# Patient Record
Sex: FEMALE | Race: WHITE | HISPANIC OR LATINO | Employment: FULL TIME | URBAN - METROPOLITAN AREA
[De-identification: names, ages, dates, MRNs, and addresses within clinical notes are randomized per-mention and may not be internally consistent; named-entity substitution may affect disease eponyms.]

---

## 2019-12-19 ENCOUNTER — APPOINTMENT (EMERGENCY)
Dept: RADIOLOGY | Facility: HOSPITAL | Age: 49
End: 2019-12-19
Payer: COMMERCIAL

## 2019-12-19 ENCOUNTER — HOSPITAL ENCOUNTER (EMERGENCY)
Facility: HOSPITAL | Age: 49
Discharge: HOME | End: 2019-12-19
Attending: EMERGENCY MEDICINE
Payer: COMMERCIAL

## 2019-12-19 VITALS
OXYGEN SATURATION: 100 % | RESPIRATION RATE: 18 BRPM | WEIGHT: 199.52 LBS | TEMPERATURE: 98.9 F | HEART RATE: 80 BPM | BODY MASS INDEX: 37.7 KG/M2 | SYSTOLIC BLOOD PRESSURE: 123 MMHG | DIASTOLIC BLOOD PRESSURE: 67 MMHG

## 2019-12-19 DIAGNOSIS — S61.209A AVULSION OF SKIN OF FINGER, INITIAL ENCOUNTER: ICD-10-CM

## 2019-12-19 DIAGNOSIS — S63.256A CLOSED DISLOCATION OF RIGHT LITTLE FINGER: Primary | ICD-10-CM

## 2019-12-19 PROCEDURE — 99283 EMERGENCY DEPT VISIT LOW MDM: CPT

## 2019-12-19 PROCEDURE — 12001 RPR S/N/AX/GEN/TRNK 2.5CM/<: CPT | Performed by: EMERGENCY MEDICINE

## 2019-12-19 PROCEDURE — 26750 TREAT FINGER FRACTURE EACH: CPT | Performed by: EMERGENCY MEDICINE

## 2019-12-19 PROCEDURE — 73130 X-RAY EXAM OF HAND: CPT

## 2019-12-19 PROCEDURE — 73140 X-RAY EXAM OF FINGER(S): CPT

## 2019-12-19 PROCEDURE — 99283 EMERGENCY DEPT VISIT LOW MDM: CPT | Performed by: EMERGENCY MEDICINE

## 2019-12-19 RX ORDER — MELOXICAM 7.5 MG/1
7.5 TABLET ORAL DAILY
COMMUNITY

## 2019-12-19 RX ORDER — OXYCODONE HYDROCHLORIDE AND ACETAMINOPHEN 5; 325 MG/1; MG/1
2 TABLET ORAL ONCE
Status: COMPLETED | OUTPATIENT
Start: 2019-12-19 | End: 2019-12-19

## 2019-12-19 RX ORDER — OXYCODONE HYDROCHLORIDE AND ACETAMINOPHEN 5; 325 MG/1; MG/1
1 TABLET ORAL EVERY 4 HOURS PRN
COMMUNITY

## 2019-12-19 RX ORDER — IBUPROFEN 600 MG/1
600 TABLET ORAL ONCE
Status: COMPLETED | OUTPATIENT
Start: 2019-12-19 | End: 2019-12-19

## 2019-12-19 RX ORDER — GINSENG 100 MG
1 CAPSULE ORAL ONCE
Status: DISCONTINUED | OUTPATIENT
Start: 2019-12-19 | End: 2019-12-19

## 2019-12-19 RX ORDER — OXYCODONE HYDROCHLORIDE AND ACETAMINOPHEN 5; 325 MG/1; MG/1
1 TABLET ORAL EVERY 4 HOURS PRN
Qty: 15 TABLET | Refills: 0 | Status: SHIPPED | OUTPATIENT
Start: 2019-12-19 | End: 2019-12-29

## 2019-12-19 RX ORDER — IBUPROFEN 600 MG/1
600 TABLET ORAL EVERY 6 HOURS PRN
Qty: 30 TABLET | Refills: 0 | Status: SHIPPED | OUTPATIENT
Start: 2019-12-19

## 2019-12-19 RX ADMIN — IBUPROFEN 600 MG: 600 TABLET ORAL at 21:55

## 2019-12-19 RX ADMIN — OXYCODONE HYDROCHLORIDE AND ACETAMINOPHEN 2 TABLET: 5; 325 TABLET ORAL at 21:54

## 2019-12-20 NOTE — ED PROVIDER NOTES
History  Chief Complaint   Patient presents with    Finger Injury     Patient states slipped while holding baby, tried to catch self and fell foward  Obv deformity to right 5th digit  Also states hit face but denies LOC   Fall     Patient presents after a fall for a finger injury  Patient was holding a baby in [employer] when she slipped on ice  Patient does not remember how she fell but did hit her face on the concrete  Denies any loss of consciousness  Only complaint is a deformity to her right little finger  History provided by:  Patient and relative   used: No    Fall   Mechanism of injury: fall    Injury location:  Face and finger  Facial injury location:  Face and lower lip  Finger injury location:  R little finger  Incident location:  Outdoors  Arrived directly from scene: yes    Fall:     Fall occurred: slipped on ice  Impact surface:  Reading    Point of impact:  Unable to specify    Entrapped after fall: no    Protective equipment: none    Suspicion of alcohol use: no    Suspicion of drug use: no    Tetanus status:  Up to date  Prior to arrival data:     Bystander interventions:  None    Patient ambulatory at scene: yes      Blood loss:  Minimal    Responsiveness at scene:  Alert    Loss of consciousness: no      Amnesic to event: no    Associated symptoms: no abdominal pain, no chest pain, no difficulty breathing, no headaches, no loss of consciousness, no nausea, no neck pain and no vomiting    Risk factors: no anticoagulation therapy        Prior to Admission Medications   Prescriptions Last Dose Informant Patient Reported? Taking?   meloxicam (MOBIC) 7 5 mg tablet  Self Yes Yes   Sig: Take 7 5 mg by mouth daily   oxyCODONE-acetaminophen (PERCOCET) 5-325 mg per tablet  Self Yes Yes   Sig: Take 1 tablet by mouth every 4 (four) hours as needed for moderate pain or severe pain      Facility-Administered Medications: None       History reviewed   No pertinent past medical history  History reviewed  No pertinent surgical history  History reviewed  No pertinent family history  I have reviewed and agree with the history as documented  Social History     Tobacco Use    Smoking status: Current Every Day Smoker    Smokeless tobacco: Never Used   Substance Use Topics    Alcohol use: Never     Frequency: Never    Drug use: Never        Review of Systems   Eyes: Negative for visual disturbance  Respiratory: Negative for chest tightness and shortness of breath  Cardiovascular: Negative for chest pain  Gastrointestinal: Negative for abdominal pain, nausea and vomiting  Musculoskeletal: Positive for arthralgias and joint swelling  Negative for neck pain  Skin: Positive for wound  Negative for color change, pallor and rash  Allergic/Immunologic: Negative for immunocompromised state  Neurological: Negative for dizziness, loss of consciousness, light-headedness and headaches  Psychiatric/Behavioral: Negative for confusion  All other systems reviewed and are negative  Physical Exam  Physical Exam   Constitutional: She is oriented to person, place, and time  She appears well-developed and well-nourished  Non-toxic appearance  She does not have a sickly appearance  She does not appear ill  No distress  HENT:   Head: Normocephalic  Head is with abrasion  Head is without raccoon's eyes, without Jin's sign, without contusion and without laceration  Right Ear: External ear normal    Left Ear: External ear normal    Nose: Nose normal  No nose lacerations, sinus tenderness or nasal deformity  No epistaxis  Right sinus exhibits no maxillary sinus tenderness and no frontal sinus tenderness  Left sinus exhibits no maxillary sinus tenderness and no frontal sinus tenderness  Mouth/Throat: Oropharynx is clear and moist and mucous membranes are normal  Mucous membranes are not pale and not cyanotic  No trismus in the jaw     Small abrasions to the right temple and right lower lip  No lacerations  Eyes: Pupils are equal, round, and reactive to light  Conjunctivae, EOM and lids are normal  Right conjunctiva is not injected  Left conjunctiva is not injected  Right eye exhibits normal extraocular motion  Left eye exhibits normal extraocular motion  Pupils are equal    Neck: Normal range of motion  Neck supple  No tracheal tenderness, no spinous process tenderness and no muscular tenderness present  No neck rigidity  No tracheal deviation present  Cardiovascular: Normal rate, regular rhythm, normal heart sounds and intact distal pulses  Exam reveals no friction rub  No murmur heard  Pulmonary/Chest: Effort normal and breath sounds normal  No accessory muscle usage or stridor  No tachypnea  No respiratory distress  She has no decreased breath sounds  She has no wheezes  She has no rales  She exhibits no tenderness, no bony tenderness, no laceration, no crepitus, no deformity and no retraction  Abdominal: Soft  She exhibits no distension  There is no tenderness  There is no rebound and no guarding  Musculoskeletal: She exhibits no edema  Right shoulder: Normal         Left shoulder: Normal         Right elbow: Normal        Left elbow: Normal         Right wrist: Normal         Left wrist: Normal         Right hip: Normal         Left hip: Normal         Right knee: Normal         Left knee: Normal         Right ankle: Normal         Left ankle: Normal         Cervical back: Normal  She exhibits normal range of motion, no tenderness, no bony tenderness, no swelling, no deformity and no laceration  Thoracic back: Normal  She exhibits normal range of motion, no tenderness, no bony tenderness, no deformity and no laceration  Lumbar back: Normal  She exhibits normal range of motion, no tenderness, no bony tenderness, no deformity and no laceration          Right hand: She exhibits decreased range of motion, tenderness, bony tenderness, deformity, laceration and swelling  She exhibits normal capillary refill  Hands:  Neurological: She is alert and oriented to person, place, and time  Skin: Skin is warm, dry and intact  She is not diaphoretic  No pallor  Psychiatric: She has a normal mood and affect  Nursing note and vitals reviewed  Vital Signs  ED Triage Vitals [12/19/19 2031]   Temperature Pulse Respirations Blood Pressure SpO2   98 9 °F (37 2 °C) (!) 116 20 (!) 172/82 96 %      Temp Source Heart Rate Source Patient Position - Orthostatic VS BP Location FiO2 (%)   Temporal Monitor Sitting Left arm --      Pain Score       Worst Possible Pain           Vitals:    12/19/19 2031 12/19/19 2130 12/19/19 2227 12/19/19 2324   BP: (!) 172/82 146/84 151/82 123/67   Pulse: (!) 116 101 86 80   Patient Position - Orthostatic VS: Sitting Sitting Sitting Sitting         Visual Acuity  Visual Acuity      Most Recent Value   L Pupil Size (mm)  3   R Pupil Size (mm)  3          ED Medications  Medications   oxyCODONE-acetaminophen (PERCOCET) 5-325 mg per tablet 2 tablet (2 tablets Oral Given 12/19/19 2154)   ibuprofen (MOTRIN) tablet 600 mg (600 mg Oral Given 12/19/19 2155)       Diagnostic Studies  Results Reviewed     None                 XR finger fifth digit-pinkie RIGHT   ED Interpretation by Karthikeyan Dooley DO (12/19 2304)   Successful reduction  Questionable irregularity of the 5th metacarpal bone near the joint of the PIP but this is best only seen on one view which is the oblique view  XR hand 3+ views RIGHT   ED Interpretation by Karthikeyan Dooley DO (12/19 2230)   PIP dislocation of the little finger  No fracture                 Procedures  Laceration repair  Date/Time: 12/19/2019 11:50 PM  Performed by: Karthikeyan Dooley DO  Authorized by: Karthikeyan Dooley DO   Consent: Verbal consent obtained    Risks and benefits: risks, benefits and alternatives were discussed  Consent given by: patient  Patient understanding: patient states understanding of the procedure being performed  Radiology Images displayed and confirmed  If images not available, report reviewed: imaging studies available  Patient identity confirmed: verbally with patient  Time out: Immediately prior to procedure a "time out" was called to verify the correct patient, procedure, equipment, support staff and site/side marked as required  Body area: upper extremity  Location details: right small finger  Laceration length: 1 cm  Tendon involvement: none  Nerve involvement: none  Vascular damage: no    Sedation:  Patient sedated: no      Wound Dehiscence:  Superficial Wound Dehiscence: simple closure      Procedure Details:  Preparation: Patient was prepped and draped in the usual sterile fashion  Irrigation solution: saline  Irrigation method: syringe  Amount of cleaning: standard  Debridement: none  Degree of undermining: none  Skin closure: glue  Technique: simple  Approximation: close  Approximation difficulty: simple  Dressing: splint (Not adhesive dressing)  Patient tolerance: Patient tolerated the procedure well with no immediate complications    Orthopedic injury treatment  Date/Time: 12/19/2019 11:50 PM  Performed by: Jasson Morfin DO  Authorized by: Jasson Morfin DO     Patient Location:  ED  Other Assisting Provider: Yes (comment)    Verbal consent obtained?: Yes    Risks and benefits: Risks, benefits and alternatives were discussed    Consent given by:  Patient  Patient states understanding of procedure being performed: Yes    Radiology Images displayed and confirmed   If images not available, report reviewed: Yes    Patient identity confirmed:  Verbally with patient  Time out: Immediately prior to the procedure a time out was called    Neurovascular status: Neurovascularly intact    Distal perfusion: normal    Neurological function: normal    Range of motion: normal    Local anesthesia used?: No    Immobilization: Splint  Supplies used:  Ortho-Glass  Neurovascular status: Neurovascularly intact    Distal perfusion: normal    Neurological function: normal               ED Course  ED Course as of Dec 20 0104   Thu Dec 19, 2019   2301 XR finger fifth digit-pinkie RIGHT                               MDM  Number of Diagnoses or Management Options  Avulsion of skin of finger, initial encounter: new and requires workup  Closed dislocation of right little finger: new and requires workup  Diagnosis management comments: Patient presents after a fall for a finger injury  Patient was holding a baby in [employer] when she slipped on ice  Patient does not remember how she fell but did hit her face on the concrete  Denies any loss of consciousness  Only complaint is a deformity to her right little finger  On exam there is an obvious deformity with pain to the entire finger and metacarpal bones  There is decreased range of motion but neurovascularly is intact  Patient has a small avulsion near the tip of her finger and a small laceration to the middle part of the finger  Plan is to start with an x-ray, give medications for pain, clean the finger and repair any lacerations  Management of finger will be determined based on the x-ray  10:43 PM  X-ray reviewed with the patient  No fracture noted but did have a dislocation  I was able to reduce his very easily on re-examination of the finger  Clinically this seems well reduced but the patient still has some pain in the area so I will obtain a 2nd x-ray to confirm  Repeat x-ray confirms good relocation  Questionable irregularity around the MCP adjacent to the D IP joint of the right little finger  Only seen best on the oblique view  Patient will be placed in a finger splint any way, small avulsion will be glued and I will have her follow up with Hand surgery         Amount and/or Complexity of Data Reviewed  Tests in the radiology section of CPT®: ordered and reviewed  Review and summarize past medical records: yes  Independent visualization of images, tracings, or specimens: yes    Patient Progress  Patient progress: stable        Disposition  Final diagnoses:   Closed dislocation of right little finger   Avulsion of skin of finger, initial encounter - right little finger     Time reflects when diagnosis was documented in both MDM as applicable and the Disposition within this note     Time User Action Codes Description Comment    12/19/2019 11:12 PM Marsha Das Add [Q58 460R] Closed dislocation of right little finger     12/19/2019 11:22 PM Barbara Flores Add [S61 209A] Avulsion of skin of finger, initial encounter     12/19/2019 11:22 PM Barbara Flores Modify [S61 209A] Avulsion of skin of finger, initial encounter right little finger      ED Disposition     ED Disposition Condition Date/Time Comment    Discharge Stable Thu Dec 19, 2019 11:11 PM [facility] discharge to home/self care  Follow-up Information     Follow up With Specialties Details Why [id number] Edwardo Johnson MD Orthopedic Surgery, Hand Surgery Call today To schedule an appointment as soon as you can [address] Jennifer Corbett MD Plastic Surgery, [address] [place] [address]/ Missael Golden             Discharge Medication List as of 12/19/2019 11:24 PM      START taking these medications    Details   ibuprofen (MOTRIN) 600 mg tablet Take 1 tablet (600 mg total) by mouth every 6 (six) hours as needed for moderate pain, Starting Thu 12/19/2019, Normal      !! oxyCODONE-acetaminophen (PERCOCET) 5-325 mg per tablet Take 1 tablet by mouth every 4 (four) hours as needed for moderate pain for up to 10 daysMax Daily Amount: 6 tablets, Starting Thu 12/19/2019, Until Sun 12/29/2019, Normal       !! - Potential duplicate medications found  Please discuss with provider        CONTINUE these medications which have NOT CHANGED    Details   meloxicam (MOBIC) 7 5 mg tablet Take 7 5 mg by mouth daily, Historical Med      !! oxyCODONE-acetaminophen (PERCOCET) 5-325 mg per tablet Take 1 tablet by mouth every 4 (four) hours as needed for moderate pain or severe pain, Historical Med       !! - Potential duplicate medications found  Please discuss with provider  No discharge procedures on file      ED Provider  Electronically Signed by           Tom Kothari DO  12/20/19 0108

## 2020-03-06 ENCOUNTER — HOSPITAL ENCOUNTER (EMERGENCY)
Facility: HOSPITAL | Age: 50
Discharge: HOME | End: 2020-03-06
Attending: EMERGENCY MEDICINE | Admitting: EMERGENCY MEDICINE
Payer: COMMERCIAL

## 2020-03-06 VITALS
SYSTOLIC BLOOD PRESSURE: 130 MMHG | WEIGHT: 184.53 LBS | OXYGEN SATURATION: 97 % | DIASTOLIC BLOOD PRESSURE: 73 MMHG | TEMPERATURE: 98.5 F | HEART RATE: 99 BPM | RESPIRATION RATE: 16 BRPM | BODY MASS INDEX: 34.87 KG/M2

## 2020-03-06 DIAGNOSIS — T78.3XXA ANGIOEDEMA, INITIAL ENCOUNTER: ICD-10-CM

## 2020-03-06 DIAGNOSIS — T78.40XA ALLERGIC REACTION, INITIAL ENCOUNTER: Primary | ICD-10-CM

## 2020-03-06 PROCEDURE — 99283 EMERGENCY DEPT VISIT LOW MDM: CPT | Performed by: EMERGENCY MEDICINE

## 2020-03-06 PROCEDURE — 99283 EMERGENCY DEPT VISIT LOW MDM: CPT

## 2020-03-06 RX ORDER — PREDNISONE 20 MG/1
40 TABLET ORAL DAILY
Qty: 8 TABLET | Refills: 0 | Status: SHIPPED | OUTPATIENT
Start: 2020-03-06 | End: 2020-03-10

## 2020-03-06 RX ORDER — FAMOTIDINE 20 MG/1
20 TABLET, FILM COATED ORAL ONCE
Status: COMPLETED | OUTPATIENT
Start: 2020-03-06 | End: 2020-03-06

## 2020-03-06 RX ORDER — CYCLOBENZAPRINE HCL 10 MG
10 TABLET ORAL 3 TIMES DAILY PRN
COMMUNITY

## 2020-03-06 RX ORDER — MELOXICAM 15 MG/1
TABLET ORAL
COMMUNITY

## 2020-03-06 RX ORDER — FAMOTIDINE 20 MG/1
20 TABLET, FILM COATED ORAL 2 TIMES DAILY
Qty: 14 TABLET | Refills: 0 | Status: SHIPPED | OUTPATIENT
Start: 2020-03-06 | End: 2020-03-13

## 2020-03-06 RX ORDER — PREDNISONE 20 MG/1
60 TABLET ORAL ONCE
Status: COMPLETED | OUTPATIENT
Start: 2020-03-06 | End: 2020-03-06

## 2020-03-06 RX ORDER — DIPHENHYDRAMINE HCL 25 MG
50 TABLET ORAL ONCE
Status: COMPLETED | OUTPATIENT
Start: 2020-03-06 | End: 2020-03-06

## 2020-03-06 RX ADMIN — PREDNISONE 60 MG: 20 TABLET ORAL at 16:26

## 2020-03-06 RX ADMIN — FAMOTIDINE 20 MG: 20 TABLET, FILM COATED ORAL at 16:26

## 2020-03-06 RX ADMIN — DIPHENHYDRAMINE HCL 50 MG: 25 TABLET ORAL at 16:26

## 2020-03-06 NOTE — ED PROVIDER NOTES
History  Chief Complaint   Patient presents with    Facial Swelling     Reports recent frequent allergic reaction to unknown substance at work place  Prescribed zyrtec  Today woke with swelling to b/l hands, L side of face  Denies difficulty breathing, difficutly swallowing  This is an otherwise healthy 31-year-old male who presents with an allergic reaction  The patient states that for the past few months, she has been experiencing allergic reactions while working at [employer] to an unknown substance  The patient states that she has been taking Zyrtec which alleviates her symptoms  She was seen by her doctor and was placed on a standing dose of Zyrtec  After working at [employer] last night, the patient woke up this morning with left-sided facial swelling and bilateral hand rash in itching  She took a Zyrtec without relief  She came to the emergency department for evaluation  Denies any tongue swelling, throat swelling, difficulty breathing/stridor, chest pain/shortness of breath, abdominal pain, nausea/vomiting  No medication changes  Denies fever/chills, nausea/vomiting, lightheadedness/dizziness, numbness/weakness, headache, change in vision, URI symptoms, neck pain, chest pain, palpitations, shortness of breath, cough, back pain, flank pain, abdominal pain, diarrhea, hematochezia, melena, dysuria, hematuria, abnormal vaginal discharge/bleeding  Prior to Admission Medications   Prescriptions Last Dose Informant Patient Reported? Taking?    cyclobenzaprine (FLEXERIL) 10 mg tablet   Yes Yes   Sig: Take 10 mg by mouth 3 (three) times a day as needed for muscle spasms   ibuprofen (MOTRIN) 600 mg tablet   No No   Sig: Take 1 tablet (600 mg total) by mouth every 6 (six) hours as needed for moderate pain   meloxicam (MOBIC) 15 mg tablet   Yes No   Sig: Take by mouth   meloxicam (MOBIC) 7 5 mg tablet  Self Yes No   Sig: Take 7 5 mg by mouth daily   oxyCODONE-acetaminophen (PERCOCET) 5-325 mg per tablet  Self Yes Yes   Sig: Take 1 tablet by mouth every 4 (four) hours as needed for moderate pain or severe pain      Facility-Administered Medications: None       History reviewed  No pertinent past medical history  History reviewed  No pertinent surgical history  History reviewed  No pertinent family history  I have reviewed and agree with the history as documented  E-Cigarette/Vaping     E-Cigarette/Vaping Substances     Social History     Tobacco Use    Smoking status: Current Every Day Smoker     Packs/day: 1 00    Smokeless tobacco: Never Used   Substance Use Topics    Alcohol use: Never     Frequency: Never    Drug use: Never       Review of Systems   Constitutional: Negative for chills and fever  HENT: Positive for facial swelling  Negative for rhinorrhea, sore throat and trouble swallowing  Eyes: Negative for photophobia and visual disturbance  Respiratory: Negative for cough, chest tightness and shortness of breath  Cardiovascular: Negative for chest pain, palpitations and leg swelling  Gastrointestinal: Negative for abdominal pain, blood in stool, diarrhea, nausea and vomiting  Endocrine: Negative for polyuria  Genitourinary: Negative for dysuria, flank pain, hematuria, vaginal bleeding and vaginal discharge  Musculoskeletal: Negative for back pain and neck pain  Skin: Positive for rash  Negative for color change  Allergic/Immunologic: Negative for immunocompromised state  Neurological: Negative for dizziness, weakness, light-headedness, numbness and headaches  All other systems reviewed and are negative  Physical Exam  Physical Exam   Constitutional: Vital signs are normal  She appears well-developed  She is cooperative  No distress  HENT:   Mouth/Throat: Uvula is midline, oropharynx is clear and moist and mucous membranes are normal  No trismus in the jaw  No uvula swelling   No oropharyngeal exudate, posterior oropharyngeal edema, posterior oropharyngeal erythema or tonsillar abscesses  No tonsillar exudate  Angioedema of left upper and left lower lip  Mild left-sided facial swelling  Eyes: Pupils are equal, round, and reactive to light  Conjunctivae and EOM are normal    Neck: Trachea normal  No thyroid mass and no thyromegaly present  Cardiovascular: Normal rate, regular rhythm, normal heart sounds, intact distal pulses and normal pulses  No murmur heard  Pulmonary/Chest: Effort normal and breath sounds normal    Abdominal: Soft  Normal appearance and bowel sounds are normal  There is no tenderness  There is no rebound, no guarding and no CVA tenderness  Musculoskeletal:   Urticaria of bilateral hands  Neurological: She is alert  Skin: Skin is warm, dry and intact  Psychiatric: She has a normal mood and affect  Her speech is normal and behavior is normal  Thought content normal        Vital Signs  ED Triage Vitals [03/06/20 1550]   Temperature Pulse Respirations Blood Pressure SpO2   98 5 °F (36 9 °C) (!) 111 20 161/85 98 %      Temp Source Heart Rate Source Patient Position - Orthostatic VS BP Location FiO2 (%)   Temporal Monitor Sitting Right arm --      Pain Score       No Pain           Vitals:    03/06/20 1550 03/06/20 1651   BP: 161/85 130/73   Pulse: (!) 111 99   Patient Position - Orthostatic VS: Sitting          Visual Acuity      ED Medications  Medications   diphenhydrAMINE (BENADRYL) tablet 50 mg (50 mg Oral Given 3/6/20 1626)   predniSONE tablet 60 mg (60 mg Oral Given 3/6/20 1626)   famotidine (PEPCID) tablet 20 mg (20 mg Oral Given 3/6/20 1626)       Diagnostic Studies  Results Reviewed     None                 No orders to display              Procedures  Procedures         ED Course                               MDM  Number of Diagnoses or Management Options  Diagnosis management comments: Allergic reaction to an unknown substance  Plan to treat with Benadryl, prednisone, Pepcid  Follow up with family doctor          Disposition  Final diagnoses: Allergic reaction, initial encounter   Angioedema, initial encounter     Time reflects when diagnosis was documented in both MDM as applicable and the Disposition within this note     Time User Action Codes Description Comment    3/6/2020  5:36 PM Moiseanjum Stanfordy JONES Add [T78 [id number]] Allergic reaction, initial encounter     3/6/2020  5:36 PM Luanne, [address]  3XXA] Angioedema, initial encounter       ED Disposition     ED Disposition Condition Date/Time Comment    Discharge Stable Fri Mar 6, 2020  5:36 PM Hayley Solano discharge to home/self care  Follow-up Information     Follow up With Specialties Details Why Contact Info Additional [address] [facility] DO [facility] Schedule an appointment as soon as possible for a visit   [address] [place] [address]       [address] Emergency Department Emergency Medicine Go to  If symptoms worsen [facility] [id number]  [place] [address] ED, [address] [facility]  , [place], [place], [address]          Patient's Medications   Discharge Prescriptions    FAMOTIDINE (PEPCID) 20 MG TABLET    Take 1 tablet (20 mg total) by mouth 2 (two) times a day for 7 days       Start Date: 3/6/2020  End Date: 3/13/2020       Order Dose: 20 mg       Quantity: 14 tablet    Refills: 0    PREDNISONE 20 MG TABLET    Take 2 tablets (40 mg total) by mouth daily for 4 days       Start Date: 3/6/2020  End Date: 3/10/2020       Order Dose: 40 mg       Quantity: 8 tablet    Refills: 0     No discharge procedures on file      PDMP Review     None          ED Provider  Electronically Signed by           Ky Juares MD  03/06/20 3331